# Patient Record
Sex: FEMALE | Race: BLACK OR AFRICAN AMERICAN | Employment: FULL TIME | URBAN - METROPOLITAN AREA
[De-identification: names, ages, dates, MRNs, and addresses within clinical notes are randomized per-mention and may not be internally consistent; named-entity substitution may affect disease eponyms.]

---

## 2017-01-05 ENCOUNTER — HOSPITAL ENCOUNTER (OUTPATIENT)
Dept: ULTRASOUND IMAGING | Age: 42
Discharge: HOME | End: 2017-01-05
Attending: UROLOGY
Payer: MEDICAID

## 2017-01-05 DIAGNOSIS — Z87.442 PERSONAL HISTORY OF KIDNEY STONES: ICD-10-CM

## 2017-01-05 PROCEDURE — 76770 US EXAM ABDO BACK WALL COMP: CPT

## 2018-03-05 ENCOUNTER — HOSPITAL ENCOUNTER (EMERGENCY)
Age: 43
Discharge: HOME | End: 2018-03-05
Attending: EMERGENCY MEDICINE
Payer: MEDICAID

## 2018-03-05 VITALS
OXYGEN SATURATION: 98 % | DIASTOLIC BLOOD PRESSURE: 83 MMHG | WEIGHT: 293 LBS | TEMPERATURE: 98.5 F | RESPIRATION RATE: 16 BRPM | SYSTOLIC BLOOD PRESSURE: 135 MMHG | BODY MASS INDEX: 55.32 KG/M2 | HEIGHT: 61 IN | HEART RATE: 84 BPM

## 2018-03-05 DIAGNOSIS — R68.89 FLU-LIKE SYMPTOMS: Primary | ICD-10-CM

## 2018-03-05 PROCEDURE — 99282 EMERGENCY DEPT VISIT SF MDM: CPT

## 2018-03-05 RX ORDER — OSELTAMIVIR PHOSPHATE 75 MG/1
75 CAPSULE ORAL 2 TIMES DAILY
Qty: 10 CAP | Refills: 0 | Status: SHIPPED | OUTPATIENT
Start: 2018-03-05 | End: 2018-03-10

## 2018-03-05 NOTE — LETTER
NOTIFICATION RETURN TO WORK / SCHOOL 
 
3/5/2018 9:54 PM 
 
Ms. Ilya Singh [address] 
[address] To Whom It May Concern: 
 
Valentina Arce is currently under the care of [facility]. She will return to work/school on:  3/8/18 If there are questions or concerns please have the patient contact our office. Sincerely,  Terrell Loera NP

## 2018-03-06 NOTE — ED PROVIDER NOTES
HPI Comments: Pt presents with flu symptoms. States son dxd with flu yesterday and she now has symptoms, fever has been controlled with Tylenol and motrin    Patient is a 43 y.o. female presenting with flu symptoms. Flu    This is a new problem. The current episode started 12 to 24 hours ago. The problem occurs constantly. The problem has not changed since onset. Her temperature was unmeasured prior to arrival. Associated symptoms include congestion, headaches, sore throat, muscle aches and cough. She has tried acetaminophen and ibuprofen for the symptoms. The treatment provided moderate relief.         Past Medical History:   Diagnosis Date    Bronchitis     GERD (gastroesophageal reflux disease)     Hypertension     Incontinence     Nocturia     Pelvic pain     Sickle-cell disease (HCC)        Past Surgical History:   Procedure Laterality Date    DELIVERY       HX  SECTION      x2    HX  SECTION      HX TUBAL LIGATION  14    Laparoscopic bilateral tubal ligation, cystoscopy    HX UROLOGICAL  14    Cystoscopy,Placement of 20-Kazakh Daly catheter, with drainage of all fluid  placed into the bladder         Family History:   Problem Relation Age of Onset    Hypertension Mother     Hypertension Maternal Grandmother     Cancer Maternal Grandmother     Hypertension Maternal Grandfather     Heart Disease Maternal Grandfather      heart failure    Cancer Maternal Grandfather      colon    Breast Cancer Maternal Aunt     Alcohol abuse Neg Hx     Arthritis-rheumatoid Neg Hx     Asthma Neg Hx     Bleeding Prob Neg Hx     Diabetes Neg Hx     Elevated Lipids Neg Hx     Headache Neg Hx     Lung Disease Neg Hx     Migraines Neg Hx     Psychiatric Disorder Neg Hx     Stroke Neg Hx     Mental Retardation Neg Hx        Social History     Social History    Marital status:      Spouse name: N/A    Number of children: N/A    Years of education: N/A Occupational History    Not on file. Social History Main Topics    Smoking status: Never Smoker    Smokeless tobacco: Never Used    Alcohol use No    Drug use: No    Sexual activity: Yes     Partners: Male     Other Topics Concern    Not on file     Social History Narrative    ** Merged History Encounter **              ALLERGIES: Lidocaine; Benadryl [diphenhydramine hcl]; Benadryl [diphenhydramine hcl]; Lisinopril; Norvasc [amlodipine]; and Norvasc [amlodipine]    Review of Systems   Constitutional: Positive for fever. HENT: Positive for congestion and sore throat. Respiratory: Positive for cough. Gastrointestinal: Negative for abdominal pain. Neurological: Positive for headaches. All other systems reviewed and are negative. Vitals:    03/05/18 2141   BP: 135/83   Pulse: 84   Resp: 16   Temp: 98.5 °F (36.9 °C)   SpO2: 98%   Weight: 135.2 kg (298 lb)   Height: 5' 1\" (1.549 m)            Physical Exam   Constitutional: She is oriented to person, place, and time. She appears well-developed and well-nourished. HENT:   Head: Normocephalic and atraumatic. Nose: Rhinorrhea present. Mouth/Throat: Mucous membranes are normal. Posterior oropharyngeal erythema present. No oropharyngeal exudate, posterior oropharyngeal edema or tonsillar abscesses. Eyes: Conjunctivae and EOM are normal. Pupils are equal, round, and reactive to light. Neck: Normal range of motion. Neck supple. Cardiovascular: Normal rate and regular rhythm. Pulmonary/Chest: Effort normal and breath sounds normal.   Abdominal: Soft. Bowel sounds are normal.   Musculoskeletal: Normal range of motion. Neurological: She is alert and oriented to person, place, and time. She has normal reflexes. Skin: Skin is warm and dry. Psychiatric: She has a normal mood and affect. Her behavior is normal. Judgment and thought content normal.   Nursing note and vitals reviewed.        MDM  Number of Diagnoses or Management Options  Flu-like symptoms: established and improving  Diagnosis management comments: Pt treated for influenza due to +exposure, symptoms, and exam.  tamiflu rx provided       Amount and/or Complexity of Data Reviewed  Review and summarize past medical records: yes  Independent visualization of images, tracings, or specimens: yes    Risk of Complications, Morbidity, and/or Mortality  Presenting problems: low  Diagnostic procedures: low  Management options: low    Patient Progress  Patient progress: stable        ED Course       Procedures              Vitals:  Patient Vitals for the past 12 hrs:   Temp Pulse Resp BP SpO2   03/05/18 2141 98.5 °F (36.9 °C) 84 16 135/83 98 %        Reevaluation of patient:   I have reassessed the patient. Patient is feeling better and is asking to go home    Disposition:    Diagnosis:   1. Flu-like symptoms        Disposition: to Home      Follow-up Information     Follow up With Details Comments [address], [place] In 2 days  [address] [facility] [address]  [phone]             Patient's Medications   Start Taking    OSELTAMIVIR (TAMIFLU) 75 MG CAPSULE    Take 1 Cap by mouth two (2) times a day for 5 days. Continue Taking    ALBUTEROL (PROVENTIL HFA, VENTOLIN HFA, PROAIR HFA) 90 MCG/ACTUATION INHALER    Take  inhaled by mouth Take As Needed. CYCLOBENZAPRINE (FLEXERIL) 10 MG TABLET    10 mg. IBUPROFEN (MOTRIN) 600 MG TABLET    600 mg. LOSARTAN-HYDROCHLOROTHIAZIDE (HYZAAR) 100-12.5 MG PER TABLET    Take 1 Tab by Mouth Once a Day. These Medications have changed    No medications on file   Stop Taking    No medications on file       Return to the ER if you are unable to obtain referral as directed. Al Singh's  results have been reviewed with her. She has been counseled regarding her diagnosis, treatment, and plan.   She verbally conveys understanding and agreement of the signs, symptoms, diagnosis, treatment and prognosis and additionally agrees to follow up as discussed. She also agrees with the care-plan and conveys that all of her questions have been answered. I have also provided discharge instructions for her that include: educational information regarding their diagnosis and treatment, and list of reasons why they would want to return to the ED prior to their follow-up appointment, should her condition change.       Odilia STEVENSP-C

## 2018-03-06 NOTE — ED TRIAGE NOTES
Pt. States \"I think I caught the flu from my child; I'm wheezing; I have body aches and I\"m having loose stools\". Reports symptoms started \"last night\".

## 2024-09-11 ENCOUNTER — OFFICE VISIT (OUTPATIENT)
Age: 49
End: 2024-09-11
Payer: COMMERCIAL

## 2024-09-11 VITALS
SYSTOLIC BLOOD PRESSURE: 142 MMHG | HEART RATE: 84 BPM | WEIGHT: 287 LBS | OXYGEN SATURATION: 97 % | BODY MASS INDEX: 54.19 KG/M2 | TEMPERATURE: 97.2 F | DIASTOLIC BLOOD PRESSURE: 82 MMHG | HEIGHT: 61 IN

## 2024-09-11 DIAGNOSIS — R01.1 SYSTOLIC MURMUR: ICD-10-CM

## 2024-09-11 DIAGNOSIS — I10 PRIMARY HYPERTENSION: ICD-10-CM

## 2024-09-11 DIAGNOSIS — E66.01 CLASS 3 SEVERE OBESITY DUE TO EXCESS CALORIES WITHOUT SERIOUS COMORBIDITY WITH BODY MASS INDEX (BMI) OF 50.0 TO 59.9 IN ADULT (HCC): ICD-10-CM

## 2024-09-11 DIAGNOSIS — I51.89 DIASTOLIC DYSFUNCTION: ICD-10-CM

## 2024-09-11 DIAGNOSIS — R06.02 EXERTIONAL SHORTNESS OF BREATH: Primary | ICD-10-CM

## 2024-09-11 DIAGNOSIS — R07.89 OTHER CHEST PAIN: ICD-10-CM

## 2024-09-11 PROCEDURE — 93000 ELECTROCARDIOGRAM COMPLETE: CPT | Performed by: INTERNAL MEDICINE

## 2024-09-11 PROCEDURE — 3077F SYST BP >= 140 MM HG: CPT | Performed by: INTERNAL MEDICINE

## 2024-09-11 PROCEDURE — 3079F DIAST BP 80-89 MM HG: CPT | Performed by: INTERNAL MEDICINE

## 2024-09-11 PROCEDURE — 99215 OFFICE O/P EST HI 40 MIN: CPT | Performed by: INTERNAL MEDICINE

## 2024-09-11 RX ORDER — FERROUS SULFATE 325(65) MG
325 TABLET ORAL
Qty: 90 TABLET | Refills: 3 | Status: SHIPPED | OUTPATIENT
Start: 2024-09-11

## 2024-09-11 RX ORDER — IBUPROFEN 800 MG/1
TABLET, FILM COATED ORAL
COMMUNITY
Start: 2024-08-24

## 2024-09-11 RX ORDER — IPRATROPIUM BROMIDE AND ALBUTEROL SULFATE 2.5; .5 MG/3ML; MG/3ML
SOLUTION RESPIRATORY (INHALATION)
COMMUNITY

## 2024-09-11 RX ORDER — FAMOTIDINE 20 MG/1
TABLET, FILM COATED ORAL
COMMUNITY
Start: 2024-06-09

## 2024-09-11 ASSESSMENT — PATIENT HEALTH QUESTIONNAIRE - PHQ9
SUM OF ALL RESPONSES TO PHQ9 QUESTIONS 1 & 2: 0
1. LITTLE INTEREST OR PLEASURE IN DOING THINGS: NOT AT ALL
SUM OF ALL RESPONSES TO PHQ QUESTIONS 1-9: 0
2. FEELING DOWN, DEPRESSED OR HOPELESS: NOT AT ALL
SUM OF ALL RESPONSES TO PHQ QUESTIONS 1-9: 0

## 2024-09-11 ASSESSMENT — ENCOUNTER SYMPTOMS
SHORTNESS OF BREATH: 1
EYES NEGATIVE: 1
GASTROINTESTINAL NEGATIVE: 1
ALLERGIC/IMMUNOLOGIC NEGATIVE: 1

## 2025-04-09 DIAGNOSIS — R06.02 EXERTIONAL SHORTNESS OF BREATH: Primary | ICD-10-CM

## 2025-04-09 DIAGNOSIS — R07.89 OTHER CHEST PAIN: ICD-10-CM

## 2025-04-09 DIAGNOSIS — R01.1 SYSTOLIC MURMUR: ICD-10-CM

## 2025-04-09 DIAGNOSIS — I10 PRIMARY HYPERTENSION: ICD-10-CM

## 2025-04-16 ENCOUNTER — TELEPHONE (OUTPATIENT)
Age: 50
End: 2025-04-16